# Patient Record
Sex: MALE | Race: BLACK OR AFRICAN AMERICAN
[De-identification: names, ages, dates, MRNs, and addresses within clinical notes are randomized per-mention and may not be internally consistent; named-entity substitution may affect disease eponyms.]

---

## 2021-05-22 ENCOUNTER — HOSPITAL ENCOUNTER (EMERGENCY)
Dept: HOSPITAL 61 - ER | Age: 21
Discharge: HOME | End: 2021-05-22
Payer: COMMERCIAL

## 2021-05-22 VITALS — DIASTOLIC BLOOD PRESSURE: 68 MMHG | SYSTOLIC BLOOD PRESSURE: 104 MMHG

## 2021-05-22 VITALS — WEIGHT: 160.28 LBS | BODY MASS INDEX: 22.44 KG/M2 | HEIGHT: 71 IN
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
## 2021-05-22 DIAGNOSIS — S02.81XA: Primary | ICD-10-CM

## 2021-05-22 DIAGNOSIS — M54.2: ICD-10-CM
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
## 2021-05-22 PROCEDURE — 70450 CT HEAD/BRAIN W/O DYE: CPT

## 2021-05-22 PROCEDURE — 72125 CT NECK SPINE W/O DYE: CPT

## 2021-05-22 PROCEDURE — 90471 IMMUNIZATION ADMIN: CPT

## 2021-05-22 PROCEDURE — 90715 TDAP VACCINE 7 YRS/> IM: CPT
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
